# Patient Record
Sex: MALE | Race: WHITE | NOT HISPANIC OR LATINO | Employment: FULL TIME | URBAN - METROPOLITAN AREA
[De-identification: names, ages, dates, MRNs, and addresses within clinical notes are randomized per-mention and may not be internally consistent; named-entity substitution may affect disease eponyms.]

---

## 2023-08-08 ENCOUNTER — LAB (OUTPATIENT)
Dept: LAB | Facility: LAB | Age: 28
End: 2023-08-08
Payer: COMMERCIAL

## 2023-08-08 ENCOUNTER — OFFICE VISIT (OUTPATIENT)
Dept: PRIMARY CARE | Facility: CLINIC | Age: 28
End: 2023-08-08
Payer: COMMERCIAL

## 2023-08-08 VITALS
HEIGHT: 69 IN | SYSTOLIC BLOOD PRESSURE: 116 MMHG | WEIGHT: 119.2 LBS | DIASTOLIC BLOOD PRESSURE: 80 MMHG | BODY MASS INDEX: 17.66 KG/M2

## 2023-08-08 DIAGNOSIS — F41.9 ANXIETY: ICD-10-CM

## 2023-08-08 DIAGNOSIS — Z00.00 ROUTINE GENERAL MEDICAL EXAMINATION AT A HEALTH CARE FACILITY: ICD-10-CM

## 2023-08-08 DIAGNOSIS — Z00.00 ROUTINE GENERAL MEDICAL EXAMINATION AT A HEALTH CARE FACILITY: Primary | ICD-10-CM

## 2023-08-08 DIAGNOSIS — Z77.21 CONTACT W AND EXPOSURE TO POTENTIALLY HAZARDOUS BODY FLUIDS: ICD-10-CM

## 2023-08-08 DIAGNOSIS — N34.2 URETHRITIS: ICD-10-CM

## 2023-08-08 DIAGNOSIS — F90.9 ATTENTION DEFICIT HYPERACTIVITY DISORDER (ADHD), UNSPECIFIED ADHD TYPE: ICD-10-CM

## 2023-08-08 DIAGNOSIS — F32.A DEPRESSION, UNSPECIFIED DEPRESSION TYPE: ICD-10-CM

## 2023-08-08 PROBLEM — F33.1 MODERATE EPISODE OF RECURRENT MAJOR DEPRESSIVE DISORDER (MULTI): Status: ACTIVE | Noted: 2023-08-08

## 2023-08-08 LAB
ALANINE AMINOTRANSFERASE (SGPT) (U/L) IN SER/PLAS: 17 U/L (ref 10–52)
ANION GAP IN SER/PLAS: 14 MMOL/L (ref 10–20)
CALCIUM (MG/DL) IN SER/PLAS: 10.1 MG/DL (ref 8.6–10.3)
CARBON DIOXIDE, TOTAL (MMOL/L) IN SER/PLAS: 27 MMOL/L (ref 21–32)
CHLORIDE (MMOL/L) IN SER/PLAS: 102 MMOL/L (ref 98–107)
CHOLESTEROL (MG/DL) IN SER/PLAS: 207 MG/DL (ref 0–199)
CHOLESTEROL IN HDL (MG/DL) IN SER/PLAS: 100.3 MG/DL
CHOLESTEROL/HDL RATIO: 2.1
CREATININE (MG/DL) IN SER/PLAS: 0.92 MG/DL (ref 0.5–1.3)
ERYTHROCYTE DISTRIBUTION WIDTH (RATIO) BY AUTOMATED COUNT: 12.9 % (ref 11.5–14.5)
ERYTHROCYTE MEAN CORPUSCULAR HEMOGLOBIN CONCENTRATION (G/DL) BY AUTOMATED: 33.5 G/DL (ref 32–36)
ERYTHROCYTE MEAN CORPUSCULAR VOLUME (FL) BY AUTOMATED COUNT: 88 FL (ref 80–100)
ERYTHROCYTES (10*6/UL) IN BLOOD BY AUTOMATED COUNT: 5.12 X10E12/L (ref 4.5–5.9)
GFR MALE: >90 ML/MIN/1.73M2
GLUCOSE (MG/DL) IN SER/PLAS: 70 MG/DL (ref 74–99)
HEMATOCRIT (%) IN BLOOD BY AUTOMATED COUNT: 45.1 % (ref 41–52)
HEMOGLOBIN (G/DL) IN BLOOD: 15.1 G/DL (ref 13.5–17.5)
HEPATITIS C VIRUS AB PRESENCE IN SERUM: NONREACTIVE
HIV 1/ 2 AG/AB SCREEN: NONREACTIVE
LDL: 90 MG/DL (ref 0–119)
LEUKOCYTES (10*3/UL) IN BLOOD BY AUTOMATED COUNT: 4.7 X10E9/L (ref 4.4–11.3)
PLATELETS (10*3/UL) IN BLOOD AUTOMATED COUNT: 271 X10E9/L (ref 150–450)
POTASSIUM (MMOL/L) IN SER/PLAS: 4.5 MMOL/L (ref 3.5–5.3)
SODIUM (MMOL/L) IN SER/PLAS: 138 MMOL/L (ref 136–145)
SYPHILIS TOTAL AB: NONREACTIVE
THYROTROPIN (MIU/L) IN SER/PLAS BY DETECTION LIMIT <= 0.05 MIU/L: 1.9 MIU/L (ref 0.44–3.98)
TRIGLYCERIDE (MG/DL) IN SER/PLAS: 82 MG/DL (ref 0–149)
UREA NITROGEN (MG/DL) IN SER/PLAS: 12 MG/DL (ref 6–23)
VLDL: 16 MG/DL (ref 0–40)

## 2023-08-08 PROCEDURE — 86780 TREPONEMA PALLIDUM: CPT

## 2023-08-08 PROCEDURE — 85027 COMPLETE CBC AUTOMATED: CPT

## 2023-08-08 PROCEDURE — 99395 PREV VISIT EST AGE 18-39: CPT | Performed by: INTERNAL MEDICINE

## 2023-08-08 PROCEDURE — 87389 HIV-1 AG W/HIV-1&-2 AB AG IA: CPT

## 2023-08-08 PROCEDURE — 36415 COLL VENOUS BLD VENIPUNCTURE: CPT

## 2023-08-08 PROCEDURE — 86803 HEPATITIS C AB TEST: CPT

## 2023-08-08 PROCEDURE — 80061 LIPID PANEL: CPT

## 2023-08-08 PROCEDURE — 80048 BASIC METABOLIC PNL TOTAL CA: CPT

## 2023-08-08 PROCEDURE — 84443 ASSAY THYROID STIM HORMONE: CPT

## 2023-08-08 PROCEDURE — 87591 N.GONORRHOEAE DNA AMP PROB: CPT

## 2023-08-08 PROCEDURE — 84460 ALANINE AMINO (ALT) (SGPT): CPT

## 2023-08-08 PROCEDURE — 87491 CHLMYD TRACH DNA AMP PROBE: CPT

## 2023-08-08 ASSESSMENT — PATIENT HEALTH QUESTIONNAIRE - PHQ9
SUM OF ALL RESPONSES TO PHQ9 QUESTIONS 1 AND 2: 4
2. FEELING DOWN, DEPRESSED OR HOPELESS: MORE THAN HALF THE DAYS
1. LITTLE INTEREST OR PLEASURE IN DOING THINGS: MORE THAN HALF THE DAYS

## 2023-08-08 NOTE — PROGRESS NOTES
"SUBJECTIVE:   Aramis Higgins is a 27 y.o. male presenting for his annual checkup.  No current outpatient medications on file.     No current facility-administered medications for this visit.     Allergies: Peanut and Shellfish derived     ROS:  Feeling well. No dyspnea or chest pain on exertion. No abdominal pain, change in bowel habits, black or bloody stools. No urinary tract or prostatic symptoms. No neurological complaints.    OBJECTIVE:   The patient appears well, alert, oriented x 3, in no distress.   /80   Ht 1.74 m (5' 8.5\")   Wt 54.1 kg (119 lb 3.2 oz)   BMI 17.86 kg/m²   ENT normal.  Neck supple. No adenopathy or thyromegaly. DANIEL. Lungs are clear, good air entry, no wheezes, rhonchi or rales. S1 and S2 normal, no murmurs, regular rate and rhythm. Abdomen is soft without tenderness, guarding, mass or organomegaly.  exam: no penile lesions or discharge, no testicular masses or tenderness, no hernias.  Extremities show no edema, normal peripheral pulses. Neurological is normal without focal findings.    ASSESSMENT:   healthy adult male    PLAN:   quit smoking, begin progressive daily aerobic exercise program, decrease or avoid alcohol intake, continue current healthy lifestyle patterns, and return for routine annual checkups       Depression/anxiety/ADD-reviewed at length.  Ongoing issue.  Denies suicidal or homicidal thinking.  Patient make appointment with a psychologist directly, stressed importance.  Will consult  access clinic for help on medicines given some confusion whether this is primarily ADD with secondary anxiety versus an underlying primary mood disorder.  Patient will follow-up with me in 6 weeks     "

## 2023-08-08 NOTE — PATIENT INSTRUCTIONS
"Please have blood work as we discussed.  I would like you to see a psychologist directly.  I have also referred you to the \"[facility] \"for a visit with psychiatry to help us decide on the best medicines to begin.  Lets get back together in 6 to 8 weeks.  "

## 2023-08-09 LAB
CHLAMYDIA TRACH., AMPLIFIED: NEGATIVE
N. GONORRHEA, AMPLIFIED: NEGATIVE

## 2023-10-02 PROBLEM — G47.00 INSOMNIA, PERSISTENT: Status: ACTIVE | Noted: 2023-10-02

## 2023-10-02 PROBLEM — R55 VASOVAGAL SYNCOPE: Status: ACTIVE | Noted: 2023-10-02

## 2023-10-02 NOTE — PROGRESS NOTES
Outpatient Psychiatry    Subjective   Aramis Higgins, a 27 y.o. male, presenting [facility] for [facility] up.  Patient is referred by Jenna Duran MD .      The patient was started on Wellbutrin 150mg and has been taking it daily for the past two weeks. States that he is feeling a lot better. The patient also takes hydroxyzine 25mg for sleep, which helps, but is tired in the morning. He has also used hydroxyzine when he feels anxious, which has helped. Denies any decreased appetite but is still low. He has not had any THC in the past two weeks. He has cravings for nicotine and continues to smoke almost daily.    Overall, the patient endorses a happier mood. He states that he feels he has a short fuse and is more annoyed by the same things at work that used to annoy him prior to treatment. He has not snapped outwardly and denies irritability or problems with personal relationships.        Psychiatric Review Of Systems:  Depressive Symptoms: appetite decreased, denies thoughts of suicide or self harm  Manic Symptoms: negative  Anxiety Symptoms: Negative   Psychotic Symptoms: negative  Other Symptoms:     Current Medications:    Current Outpatient Medications:     hydrOXYzine HCL (Atarax) 25 mg tablet, Take 0.5 tablets (12.5 mg) by mouth every 8 hours if needed for anxiety., Disp: , Rfl:     buPROPion XL (Wellbutrin XL) 150 mg 24 hr tablet, Take 1 tablet (150 mg) by mouth once daily. as directed, Disp: 30 tablet, Rfl: 1    Medical History:  Past Medical History:   Diagnosis Date    Other specified health status     Known health problems: none    Personal history of other specified conditions 10/30/2017    History of syncope    Syncope and collapse 01/16/2017    Episode of loss of consciousness       Substance Use History:  Tobacco use:  daily nictoine  Use of alcohol: denied  Use of caffeine: denies use      Record Review: extensive     Medical Review Of Systems:  A comprehensive review of systems  "was negative.    Objective   Mental Status Exam  Appearance: Appears stated age, well groomed  Attitude: Calm, cooperative, and engaged in conversation.  Behavior: Appropriate eye contact.   Motor Activity: No psychomotor agitation or retardation. No abnormal movements, tremors or tics. No evidence of extrapyramidal symptoms or tardive dyskinesia.  Speech: Regular rate, rhythm, volume. Spontaneous, no pressured speech.  Mood: \"I feel good\"  Affect: Euthymic, full range, mood congruent.  Thought Process: Linear, logical, and goal-directed. No loose associations or gross thought disorganization.  Thought Content: Denied current suicidal ideation or thoughts of harm to self, denied homicidal ideation or thoughts of harm to others. No delusional thinking elicited. No perseverations or obsessions identified.   Perception: Did not endorse auditory or visual hallucinations, did not appear to be responding to hallucinatory stimuli.   Cognition: Alert, oriented x3. Preserved attention span and concentration, recent and remote memory. Adequate fund of knowledge. No deficits in language.   Insight: Fair, in regards to understanding mental health condition  Judgement: Fair    Other Objective Information:  None    Vitals:  There were no vitals filed for this visit.    Assessment/Plan     Impression:  Moderate episode of recurrent major depressive disorder (CMS/HCC)    Anxiety    Attention deficit hyperactivity disorder (ADHD), unspecified ADHD type    The patient is a 28-year-old male with depression, anxiety, and a history of ADHD presenting to [facility] for [facility]-up. The patient has experienced positive effects of Wellbutrin, including a brighter mood and better motivation. Denies thoughts of self-harm or suicidal ideation. The patient also cites improvement in anxiety with taking hydroxyzine as needed but is experiencing morning fog. The patient is still experiencing nicotine cravings. Given that the patient has only " been on Wellbutrin for two weeks, there may be room for his symptoms to continue to improve on this dose. Morning fog may be related to sensitivity to hydroxyzine. Provider dicussed potential for nicotine replacement therapy if cravings are not reduced on Wellbutrin.    Risk Assessment:  Risk of harm to self: Low Risk -- Risk factors include: Depression Protective factors include:Denies current suicidal ideation, Denies history of suicide attempts , Willingness to seek help and support , Receiving and engaged in care for mental, physical, and substance use disorders , Support through ongoing medical and mental healthcare relationships , Current/history of good response to treatment/meds , and Interpersonal relationships and supports, e.g., family, friends, peers, community     Risk of harm to others: Low Risk - Risk factors include: No significant risk factors identified on screening. Protective factors include: Lack of known history of harm to others , Lack of known history of violent ideation , Lack of known access to firearms , Sense of optimism, hope , Interpersonal competence , Sense of self-efficacy, internal locus of control , and Positive, pro-social family/peer network     Plan/Recommendations:  Medications: Continue Wellbutrin 150mg for 2 more weeks. If your symptoms do not continue to improve may start taking two 150mg tablets every morning (total 300mg) and notify your primary care physician.  Orders: Wellbutrin 150mg, hydroxyzine 12.5mg every 8 hours as needed for anxiety  Follow up: Discharge to primary care provider.   Will provide outpatient   Call  Psychiatry at [phone] with issues or message through [employer]  For [facility] residents, Mobile Crisis is a 24/7 hotline you can call for assistance [[phone]]. Please call 273/129 or go to your closest Emergency Room if you feel unsafe. This includes thoughts of hurting yourself or anyone else, or having other troubles such as hearing  voices, seeing visions, or having new and scary thoughts about the people around you.    Review with patient: Treatment plan reviewed with the patient.  Medication risks/benefit reviewed with the patient    No orders of the defined types were placed in this encounter.      Seen and discussed with attending, Dr. Opal Wynne, who agrees with above.     Lucy Garcia MD

## 2023-10-03 ENCOUNTER — OFFICE VISIT (OUTPATIENT)
Dept: BEHAVIORAL HEALTH | Facility: CLINIC | Age: 28
End: 2023-10-03
Payer: COMMERCIAL

## 2023-10-03 DIAGNOSIS — F33.1 MODERATE EPISODE OF RECURRENT MAJOR DEPRESSIVE DISORDER (MULTI): Primary | ICD-10-CM

## 2023-10-03 DIAGNOSIS — F90.9 ATTENTION DEFICIT HYPERACTIVITY DISORDER (ADHD), UNSPECIFIED ADHD TYPE: ICD-10-CM

## 2023-10-03 DIAGNOSIS — F41.9 ANXIETY: ICD-10-CM

## 2023-10-03 PROCEDURE — 99204 OFFICE O/P NEW MOD 45 MIN: CPT | Performed by: STUDENT IN AN ORGANIZED HEALTH CARE EDUCATION/TRAINING PROGRAM

## 2023-10-03 RX ORDER — BUPROPION HYDROCHLORIDE 150 MG/1
150 TABLET ORAL DAILY
COMMUNITY
Start: 2023-09-12 | End: 2023-10-03 | Stop reason: SDUPTHER

## 2023-10-03 RX ORDER — HYDROXYZINE HYDROCHLORIDE 25 MG/1
12.5 TABLET, FILM COATED ORAL EVERY 8 HOURS PRN
COMMUNITY
Start: 2023-09-12

## 2023-10-03 RX ORDER — BUPROPION HYDROCHLORIDE 150 MG/1
150 TABLET ORAL DAILY
Qty: 30 TABLET | Refills: 1 | Status: SHIPPED | OUTPATIENT
Start: 2023-10-03 | End: 2023-11-14 | Stop reason: SDUPTHER

## 2023-10-03 NOTE — LETTER
Dear Aramis,    Below is a brief summary of our encounter:    Medications: Continue Wellbutrin 150mg for 2 more weeks. If your symptoms do not continue to improve may start taking two 150mg tablets every morning (total 300mg) and notify your primary care physician.  Orders: Wellbutrin 150mg daily, hydroxyzine 12.5mg every 8 hours as needed for anxiety  Follow up: Discharge to primary care provider.   General list of outpatient mental health resources to be provided  Call  Psychiatry at [phone] with issues or message through [employer]  For [facility] residents, [employer] is a 24/7 hotline you can call for assistance [[phone]]. Please call [phone]/172 or go to your closest Emergency Room if you feel unsafe. This includes thoughts of hurting yourself or anyone else, or having other troubles such as hearing voices, seeing visions, or having new and scary thoughts about the people around you.    Sincerely,    Lucy Garcia MD [facility]

## 2023-10-03 NOTE — LETTER
October 3, 2023     Jenna Duran MD  [address]  [facility]  [facility] [address]    Patient: Aramis Higgins   YOB: 1995   Date of Visit: 10/3/2023       Dear Dr. Jenna Duran MD:    Thank you for referring Aramis Higgins to me for evaluation. Below are my notes for this consultation.  If you have questions, please do not hesitate to call me. I look forward to following your patient along with you.       Sincerely,     Lucy Garcia MD      CC: No Recipients  ______________________________________________________________________________________    Outpatient Psychiatry    Subjective  Aramis Higgins, a 27 y.o. male, presenting [facility].  Patient is referred by Jenna Duran MD .      The patient was started on Wellbutrin 150mg and has been taking it daily for the past two weeks. States that he is feeling a lot better. The patient also takes hydroxyzine 25mg for sleep, which helps, but is tired in the morning. He has also used hydroxyzine when he feels anxious, which has helped. Denies any decreased appetite but is still low. He has not had any THC in the past two weeks. He has cravings for nicotine and continues to smoke almost daily.    Overall, the patient endorses a happier mood. He states that he feels he has a short fuse and is more annoyed by the same things at work that used to annoy him prior to treatment. He has not snapped outwardly and denies irritability or problems with personal relationships.        Psychiatric Review Of Systems:  Depressive Symptoms: appetite decreased, denies thoughts of suicide or self harm  Manic Symptoms: negative  Anxiety Symptoms: Negative   Psychotic Symptoms: negative  Other Symptoms:     Current Medications:    Current Outpatient Medications:   •  hydrOXYzine HCL (Atarax) 25 mg tablet, Take 0.5 tablets (12.5 mg) by mouth every 8 hours if needed for anxiety., Disp: , Rfl:   •  buPROPion XL  "(Wellbutrin XL) 150 mg 24 hr tablet, Take 1 tablet (150 mg) by mouth once daily. as directed, Disp: 30 tablet, Rfl: 1    Medical History:  Past Medical History:   Diagnosis Date   • Other specified health status     Known health problems: none   • Personal history of other specified conditions 10/30/2017    History of syncope   • Syncope and collapse 01/16/2017    Episode of loss of consciousness       Substance Use History:  Tobacco use:  daily nictoine  Use of alcohol: denied  Use of caffeine: denies use      Record Review: extensive     Medical Review Of Systems:  A comprehensive review of systems was negative.    Objective  Mental Status Exam  Appearance: Appears stated age, well groomed  Attitude: Calm, cooperative, and engaged in conversation.  Behavior: Appropriate eye contact.   Motor Activity: No psychomotor agitation or retardation. No abnormal movements, tremors or tics. No evidence of extrapyramidal symptoms or tardive dyskinesia.  Speech: Regular rate, rhythm, volume. Spontaneous, no pressured speech.  Mood: \"I feel good\"  Affect: Euthymic, full range, mood congruent.  Thought Process: Linear, logical, and goal-directed. No loose associations or gross thought disorganization.  Thought Content: Denied current suicidal ideation or thoughts of harm to self, denied homicidal ideation or thoughts of harm to others. No delusional thinking elicited. No perseverations or obsessions identified.   Perception: Did not endorse auditory or visual hallucinations, did not appear to be responding to hallucinatory stimuli.   Cognition: Alert, oriented x3. Preserved attention span and concentration, recent and remote memory. Adequate fund of knowledge. No deficits in language.   Insight: Fair, in regards to understanding mental health condition  Judgement: Fair    Other Objective Information:  None    Vitals:  There were no vitals filed for this visit.    Assessment/Plan    Impression:  Moderate episode of recurrent " major depressive disorder (CMS/HCC)    Anxiety    Attention deficit hyperactivity disorder (ADHD), unspecified ADHD type    The patient is a 28-year-old male with depression, anxiety, and a history of ADHD presenting to [facility] for [facility]-up. The patient has experienced positive effects of Wellbutrin, including a brighter mood and better motivation. Denies thoughts of self-harm or suicidal ideation. The patient also cites improvement in anxiety with taking hydroxyzine as needed but is experiencing morning fog. The patient is still experiencing nicotine cravings. Given that the patient has only been on Wellbutrin for two weeks, there may be room for his symptoms to continue to improve on this dose. Morning fog may be related to sensitivity to hydroxyzine. Provider dicussed potential for nicotine replacement therapy if cravings are not reduced on Wellbutrin.    Risk Assessment:  Risk of harm to self: Low Risk -- Risk factors include: Depression Protective factors include:Denies current suicidal ideation, Denies history of suicide attempts , Willingness to seek help and support , Receiving and engaged in care for mental, physical, and substance use disorders , Support through ongoing medical and mental healthcare relationships , Current/history of good response to treatment/meds , and Interpersonal relationships and supports, e.g., family, friends, peers, community     Risk of harm to others: Low Risk - Risk factors include: No significant risk factors identified on screening. Protective factors include: Lack of known history of harm to others , Lack of known history of violent ideation , Lack of known access to firearms , Sense of optimism, hope , Interpersonal competence , Sense of self-efficacy, internal locus of control , and Positive, pro-social family/peer network     Plan/Recommendations:  Medications: Continue Wellbutrin 150mg for 2 more weeks. If your symptoms do not continue to improve may start taking  two 150mg tablets every morning (total 300mg) and notify your primary care physician.  Orders: Wellbutrin 150mg, hydroxyzine 12.5mg every 8 hours as needed for anxiety  Follow up: Discharge to primary care provider.   Will provide outpatient   Call  Psychiatry at [phone] with issues or message through [employer]  For [facility] residents, Mobile [employer] is a 24/7 hotline you can call for assistance [832.428.8037]. Please call [phone]/899 or go to your closest Emergency Room if you feel unsafe. This includes thoughts of hurting yourself or anyone else, or having other troubles such as hearing voices, seeing visions, or having new and scary thoughts about the people around you.    Review with patient: Treatment plan reviewed with the patient.  Medication risks/benefit reviewed with the patient    No orders of the defined types were placed in this encounter.      Seen and discussed with attending, Dr. Opal Wynne, who agrees with above.     Lucy Garcia MD

## 2023-10-03 NOTE — PATIENT INSTRUCTIONS
Medications: Continue Wellbutrin 150mg for 2 more weeks. If your symptoms do not continue to improve may start taking two 150mg tablets every morning (total 300mg) and notify your primary care physician.  Orders: Wellbutrin 150mg, hydroxyzine 12.5mg every 8 hours as needed for anxiety  Follow up: Discharge to primary care provider.   General list of outpatient mental health resources to be provided  Call  Psychiatry at [phone] with issues or message through [employer]  For [facility], Mobile [employer] is a 24/7 hotline you can call for assistance [234.665.7683]. Please call 328/318 or go to your closest Emergency Room if you feel unsafe. This includes thoughts of hurting yourself or anyone else, or having other troubles such as hearing voices, seeing visions, or having new and scary thoughts about the people around you.

## 2023-10-11 ENCOUNTER — DOCUMENTATION (OUTPATIENT)
Dept: PEDIATRICS | Facility: CLINIC | Age: 28
End: 2023-10-11

## 2023-10-14 ENCOUNTER — TELEPHONE (OUTPATIENT)
Dept: BEHAVIORAL HEALTH | Facility: CLINIC | Age: 28
End: 2023-10-14

## 2023-10-14 NOTE — PROGRESS NOTES
Called patient's [facility] pharmacy on [address] at [phone]. Patient's 60 day refill of bupropion ordered on 10/3 is ready for pickup at this time. Further refills cannot be dispensed as it is too early. Patient confirmed that they currently have enough medication. Recommended picking up prior to insurance change.    PCP to take over prescription going forward, however patient may contact provider or PCP if they would like to follow up on increasing dose.    Discussed plan with patient who agreed.

## 2023-11-14 ENCOUNTER — PHARMACY VISIT (OUTPATIENT)
Dept: PHARMACY | Facility: CLINIC | Age: 28
End: 2023-11-14

## 2023-11-14 DIAGNOSIS — F33.1 MODERATE EPISODE OF RECURRENT MAJOR DEPRESSIVE DISORDER (MULTI): ICD-10-CM

## 2023-11-14 PROCEDURE — RXMED WILLOW AMBULATORY MEDICATION CHARGE

## 2023-11-14 RX ORDER — BUPROPION HYDROCHLORIDE 150 MG/1
150 TABLET ORAL DAILY
Qty: 30 TABLET | Refills: 1 | Status: SHIPPED | OUTPATIENT
Start: 2023-11-14 | End: 2023-11-17 | Stop reason: SDUPTHER

## 2023-11-17 ENCOUNTER — TELEPHONE (OUTPATIENT)
Dept: PRIMARY CARE | Facility: CLINIC | Age: 28
End: 2023-11-17

## 2023-11-17 RX ORDER — BUPROPION HYDROCHLORIDE 150 MG/1
150 TABLET ORAL DAILY
Qty: 30 TABLET | Refills: 1 | Status: SHIPPED | OUTPATIENT
Start: 2023-11-17 | End: 2024-02-08

## 2023-11-17 NOTE — TELEPHONE ENCOUNTER
Both the mom and Dad of this pt have called and left a msg stating that the pt's med was sent to [facility].  Pt no longer has  insurance and needs to have this script sent to [facility] on [address].  They are asking for this ASAP as the pt is freaking out not having his med.  Wellbutrin.

## 2023-12-14 ENCOUNTER — OFFICE VISIT (OUTPATIENT)
Dept: PRIMARY CARE | Facility: CLINIC | Age: 28
End: 2023-12-14
Payer: COMMERCIAL

## 2023-12-14 VITALS
WEIGHT: 120 LBS | DIASTOLIC BLOOD PRESSURE: 80 MMHG | BODY MASS INDEX: 17.98 KG/M2 | SYSTOLIC BLOOD PRESSURE: 120 MMHG | TEMPERATURE: 98.3 F

## 2023-12-14 DIAGNOSIS — F33.1 MODERATE EPISODE OF RECURRENT MAJOR DEPRESSIVE DISORDER (MULTI): Primary | ICD-10-CM

## 2023-12-14 PROCEDURE — 99213 OFFICE O/P EST LOW 20 MIN: CPT | Performed by: INTERNAL MEDICINE

## 2023-12-14 NOTE — PROGRESS NOTES
"Subjective   Patient ID: Aramis Higgins is a 28 y.o. male who presents for Follow-up (On meds).    HPI   Follow-up depression/ADD.  Status post psychiatry evaluation.  Added bupropion.  States \"much improved \".  No adverse effects.  Mood is much better.  Better able to work.  No suicidal or homicidal thinking.  Review of Systems    Objective   /80   Temp 36.8 °C (98.3 °F)   Wt 54.4 kg (120 lb)   BMI 17.98 kg/m²     Physical Exam    Alert and oriented x 3.  Smiling.  Good eye contact.  Lungs clear to auscultation.  Heart regular.  Lab Results   Component Value Date    WBC 4.7 08/08/2023    HGB 15.1 08/08/2023    HCT 45.1 08/08/2023     08/08/2023    CHOL 207 (H) 08/08/2023    TRIG 82 08/08/2023    .3 08/08/2023    ALT 17 08/08/2023    AST 18 02/23/2021     08/08/2023    K 4.5 08/08/2023     08/08/2023    CREATININE 0.92 08/08/2023    BUN 12 08/08/2023    CO2 27 08/08/2023    TSH 1.90 08/08/2023        Assessment/Plan       Depression/anxiety/ADD-much improved with treatment.  Continue.  Follow-up 4 months  "

## 2024-02-07 DIAGNOSIS — F33.1 MODERATE EPISODE OF RECURRENT MAJOR DEPRESSIVE DISORDER (MULTI): ICD-10-CM

## 2024-02-08 RX ORDER — BUPROPION HYDROCHLORIDE 150 MG/1
150 TABLET ORAL DAILY
Qty: 30 TABLET | Refills: 1 | Status: SHIPPED | OUTPATIENT
Start: 2024-02-08 | End: 2024-04-05

## 2024-04-05 DIAGNOSIS — F33.1 MODERATE EPISODE OF RECURRENT MAJOR DEPRESSIVE DISORDER (MULTI): ICD-10-CM

## 2024-04-05 RX ORDER — BUPROPION HYDROCHLORIDE 150 MG/1
150 TABLET ORAL DAILY
Qty: 30 TABLET | Refills: 1 | Status: SHIPPED | OUTPATIENT
Start: 2024-04-05 | End: 2024-06-03

## 2024-06-01 DIAGNOSIS — F33.1 MODERATE EPISODE OF RECURRENT MAJOR DEPRESSIVE DISORDER (MULTI): ICD-10-CM

## 2024-06-03 DIAGNOSIS — F33.1 MODERATE EPISODE OF RECURRENT MAJOR DEPRESSIVE DISORDER (MULTI): ICD-10-CM

## 2024-06-03 RX ORDER — BUPROPION HYDROCHLORIDE 150 MG/1
150 TABLET ORAL DAILY
Qty: 30 TABLET | Refills: 1 | Status: SHIPPED | OUTPATIENT
Start: 2024-06-03 | End: 2024-08-02

## 2024-06-03 NOTE — TELEPHONE ENCOUNTER
PATIENT NEEDS REFILL ON BUPROPION 150MG TO [facility] [address] [place]. LOV 12/14/23  PT TOOK LAST PILL TODAY

## 2024-06-03 NOTE — TELEPHONE ENCOUNTER
----- Message from Aramis Higgins sent at 6/3/2024 10:08 AM EDT -----  Regarding: bupropion prescription  Contact: [phone]  luis manuel Duran,  i took my last pill from my prescription today and my pharmacy has had it ready for me everytime until now but they said i needed to contact my prescriber to get it refilled!

## 2024-07-25 ENCOUNTER — APPOINTMENT (OUTPATIENT)
Dept: PRIMARY CARE | Facility: CLINIC | Age: 29
End: 2024-07-25
Payer: COMMERCIAL

## 2024-07-25 VITALS
BODY MASS INDEX: 18.93 KG/M2 | SYSTOLIC BLOOD PRESSURE: 112 MMHG | WEIGHT: 127.8 LBS | DIASTOLIC BLOOD PRESSURE: 74 MMHG | HEIGHT: 69 IN

## 2024-07-25 DIAGNOSIS — F33.1 MODERATE EPISODE OF RECURRENT MAJOR DEPRESSIVE DISORDER (MULTI): ICD-10-CM

## 2024-07-25 PROCEDURE — 1036F TOBACCO NON-USER: CPT | Performed by: INTERNAL MEDICINE

## 2024-07-25 PROCEDURE — 3008F BODY MASS INDEX DOCD: CPT | Performed by: INTERNAL MEDICINE

## 2024-07-25 PROCEDURE — 99213 OFFICE O/P EST LOW 20 MIN: CPT | Performed by: INTERNAL MEDICINE

## 2024-07-25 RX ORDER — BUPROPION HYDROCHLORIDE 150 MG/1
150 TABLET ORAL DAILY
Qty: 90 TABLET | Refills: 3 | Status: SHIPPED | OUTPATIENT
Start: 2024-07-25 | End: 2025-07-25

## 2024-07-25 RX ORDER — HYDROXYZINE HYDROCHLORIDE 25 MG/1
12.5 TABLET, FILM COATED ORAL EVERY 8 HOURS PRN
Qty: 100 TABLET | Refills: 1 | Status: SHIPPED | OUTPATIENT
Start: 2024-07-25

## 2024-07-25 NOTE — PROGRESS NOTES
"Subjective   Patient ID: Aramis Higgins is a 28 y.o. male who presents for Med Refill.    Med Refill     Follow-up depression/ADD.  Status post psychiatry evaluation.  Remains on bupropion.   \"much improved \"/\"good\".  No adverse effects.  Mood is much better.  Better able to work.  No suicidal or homicidal thinking.  Using Atarax ~1-3x/week for \"anxiety\"   Sleeping OK    Review of Systems    Objective   /74   Ht 1.753 m (5' 9\")   Wt 58 kg (127 lb 12.8 oz)   BMI 18.87 kg/m²     Physical Exam    Alert and oriented x 3.  Smiling.  Good eye contact.  Lungs clear to auscultation.  Heart regular.  Lab Results   Component Value Date    WBC 4.7 08/08/2023    HGB 15.1 08/08/2023    HCT 45.1 08/08/2023     08/08/2023    CHOL 207 (H) 08/08/2023    TRIG 82 08/08/2023    .3 08/08/2023    ALT 17 08/08/2023    AST 18 02/23/2021     08/08/2023    K 4.5 08/08/2023     08/08/2023    CREATININE 0.92 08/08/2023    BUN 12 08/08/2023    CO2 27 08/08/2023    TSH 1.90 08/08/2023        Assessment/Plan       Depression/anxiety/ADD-much improved with treatment.  Continue.  Follow-up 6 months  "

## 2024-12-05 DIAGNOSIS — F33.1 MODERATE EPISODE OF RECURRENT MAJOR DEPRESSIVE DISORDER: ICD-10-CM

## 2024-12-06 RX ORDER — HYDROXYZINE HYDROCHLORIDE 25 MG/1
12.5 TABLET, FILM COATED ORAL EVERY 8 HOURS PRN
Qty: 100 TABLET | Refills: 1 | Status: SHIPPED | OUTPATIENT
Start: 2024-12-06

## 2025-07-06 ENCOUNTER — PATIENT MESSAGE (OUTPATIENT)
Dept: PRIMARY CARE | Facility: CLINIC | Age: 30
End: 2025-07-06
Payer: COMMERCIAL

## 2025-07-06 DIAGNOSIS — F33.1 MODERATE EPISODE OF RECURRENT MAJOR DEPRESSIVE DISORDER: ICD-10-CM

## 2025-07-07 RX ORDER — BUPROPION HYDROCHLORIDE 150 MG/1
150 TABLET ORAL DAILY
Qty: 90 TABLET | Refills: 3 | Status: SHIPPED | OUTPATIENT
Start: 2025-07-07 | End: 2026-07-07

## 2025-11-20 ENCOUNTER — APPOINTMENT (OUTPATIENT)
Dept: PRIMARY CARE | Facility: CLINIC | Age: 30
End: 2025-11-20
Payer: COMMERCIAL